# Patient Record
Sex: MALE | Race: ASIAN | NOT HISPANIC OR LATINO
[De-identification: names, ages, dates, MRNs, and addresses within clinical notes are randomized per-mention and may not be internally consistent; named-entity substitution may affect disease eponyms.]

---

## 2017-04-04 ENCOUNTER — APPOINTMENT (OUTPATIENT)
Dept: INTERNAL MEDICINE | Facility: CLINIC | Age: 32
End: 2017-04-04

## 2017-05-18 ENCOUNTER — TRANSCRIPTION ENCOUNTER (OUTPATIENT)
Age: 32
End: 2017-05-18

## 2017-05-27 ENCOUNTER — EMERGENCY (EMERGENCY)
Facility: HOSPITAL | Age: 32
LOS: 1 days | Discharge: HOME | End: 2017-05-27
Attending: PERSONAL EMERGENCY RESPONSE ATTENDANT | Admitting: PERSONAL EMERGENCY RESPONSE ATTENDANT
Payer: COMMERCIAL

## 2017-05-27 VITALS
OXYGEN SATURATION: 96 % | SYSTOLIC BLOOD PRESSURE: 113 MMHG | RESPIRATION RATE: 18 BRPM | HEART RATE: 96 BPM | TEMPERATURE: 99 F | DIASTOLIC BLOOD PRESSURE: 73 MMHG

## 2017-05-27 VITALS
SYSTOLIC BLOOD PRESSURE: 113 MMHG | DIASTOLIC BLOOD PRESSURE: 72 MMHG | RESPIRATION RATE: 18 BRPM | HEART RATE: 106 BPM | TEMPERATURE: 99 F | OXYGEN SATURATION: 94 %

## 2017-05-27 DIAGNOSIS — R10.31 RIGHT LOWER QUADRANT PAIN: ICD-10-CM

## 2017-05-27 DIAGNOSIS — R19.7 DIARRHEA, UNSPECIFIED: ICD-10-CM

## 2017-05-27 DIAGNOSIS — R00.0 TACHYCARDIA, UNSPECIFIED: ICD-10-CM

## 2017-05-27 DIAGNOSIS — R50.9 FEVER, UNSPECIFIED: ICD-10-CM

## 2017-05-27 DIAGNOSIS — R63.0 ANOREXIA: ICD-10-CM

## 2017-05-27 DIAGNOSIS — R11.0 NAUSEA: ICD-10-CM

## 2017-05-27 LAB
ALBUMIN SERPL ELPH-MCNC: 4.2 G/DL — SIGNIFICANT CHANGE UP (ref 3.3–5)
ALP SERPL-CCNC: 53 U/L — SIGNIFICANT CHANGE UP (ref 40–120)
ALT FLD-CCNC: 19 U/L RC — SIGNIFICANT CHANGE UP (ref 10–45)
ANION GAP SERPL CALC-SCNC: 14 MMOL/L — SIGNIFICANT CHANGE UP (ref 5–17)
APPEARANCE UR: CLEAR — SIGNIFICANT CHANGE UP
AST SERPL-CCNC: 24 U/L — SIGNIFICANT CHANGE UP (ref 10–40)
BACTERIA # UR AUTO: ABNORMAL /HPF
BASOPHILS # BLD AUTO: 0 K/UL — SIGNIFICANT CHANGE UP (ref 0–0.2)
BILIRUB SERPL-MCNC: 0.9 MG/DL — SIGNIFICANT CHANGE UP (ref 0.2–1.2)
BILIRUB UR-MCNC: NEGATIVE — SIGNIFICANT CHANGE UP
BUN SERPL-MCNC: 13 MG/DL — SIGNIFICANT CHANGE UP (ref 7–23)
CALCIUM SERPL-MCNC: 9.6 MG/DL — SIGNIFICANT CHANGE UP (ref 8.4–10.5)
CHLORIDE SERPL-SCNC: 102 MMOL/L — SIGNIFICANT CHANGE UP (ref 96–108)
CO2 SERPL-SCNC: 25 MMOL/L — SIGNIFICANT CHANGE UP (ref 22–31)
COLOR SPEC: YELLOW — SIGNIFICANT CHANGE UP
CREAT SERPL-MCNC: 1.35 MG/DL — HIGH (ref 0.5–1.3)
DIFF PNL FLD: ABNORMAL
EOSINOPHIL # BLD AUTO: 0 K/UL — SIGNIFICANT CHANGE UP (ref 0–0.5)
GAS PNL BLDV: SIGNIFICANT CHANGE UP
GLUCOSE SERPL-MCNC: 146 MG/DL — HIGH (ref 70–99)
GLUCOSE UR QL: NEGATIVE — SIGNIFICANT CHANGE UP
HCT VFR BLD CALC: 45.3 % — SIGNIFICANT CHANGE UP (ref 39–50)
HGB BLD-MCNC: 15.3 G/DL — SIGNIFICANT CHANGE UP (ref 13–17)
KETONES UR-MCNC: ABNORMAL
LEUKOCYTE ESTERASE UR-ACNC: NEGATIVE — SIGNIFICANT CHANGE UP
LIDOCAIN IGE QN: 15 U/L — SIGNIFICANT CHANGE UP (ref 7–60)
LYMPHOCYTES # BLD AUTO: 0.5 K/UL — LOW (ref 1–3.3)
LYMPHOCYTES # BLD AUTO: 3 % — LOW (ref 13–44)
MAGNESIUM SERPL-MCNC: 1.7 MG/DL — SIGNIFICANT CHANGE UP (ref 1.6–2.6)
MCHC RBC-ENTMCNC: 30.6 PG — SIGNIFICANT CHANGE UP (ref 27–34)
MCHC RBC-ENTMCNC: 33.6 GM/DL — SIGNIFICANT CHANGE UP (ref 32–36)
MCV RBC AUTO: 90.9 FL — SIGNIFICANT CHANGE UP (ref 80–100)
MONOCYTES # BLD AUTO: 1 K/UL — HIGH (ref 0–0.9)
MONOCYTES NFR BLD AUTO: 3 % — SIGNIFICANT CHANGE UP (ref 2–14)
NEUTROPHILS # BLD AUTO: 15.5 K/UL — HIGH (ref 1.8–7.4)
NEUTROPHILS NFR BLD AUTO: 87 % — HIGH (ref 43–77)
NITRITE UR-MCNC: NEGATIVE — SIGNIFICANT CHANGE UP
PH UR: 6 — SIGNIFICANT CHANGE UP (ref 5–8)
PHOSPHATE SERPL-MCNC: 1.4 MG/DL — LOW (ref 2.5–4.5)
PLATELET # BLD AUTO: 239 K/UL — SIGNIFICANT CHANGE UP (ref 150–400)
POTASSIUM SERPL-MCNC: 3.8 MMOL/L — SIGNIFICANT CHANGE UP (ref 3.5–5.3)
POTASSIUM SERPL-SCNC: 3.8 MMOL/L — SIGNIFICANT CHANGE UP (ref 3.5–5.3)
PROT SERPL-MCNC: 7.4 G/DL — SIGNIFICANT CHANGE UP (ref 6–8.3)
PROT UR-MCNC: 100 MG/DL
RBC # BLD: 4.99 M/UL — SIGNIFICANT CHANGE UP (ref 4.2–5.8)
RBC # FLD: 11.9 % — SIGNIFICANT CHANGE UP (ref 10.3–14.5)
RBC CASTS # UR COMP ASSIST: ABNORMAL /HPF (ref 0–2)
SODIUM SERPL-SCNC: 141 MMOL/L — SIGNIFICANT CHANGE UP (ref 135–145)
SP GR SPEC: >1.03 — HIGH (ref 1.01–1.02)
UROBILINOGEN FLD QL: NEGATIVE — SIGNIFICANT CHANGE UP
WBC # BLD: 17.1 K/UL — HIGH (ref 3.8–10.5)
WBC # FLD AUTO: 17.1 K/UL — HIGH (ref 3.8–10.5)
WBC UR QL: SIGNIFICANT CHANGE UP /HPF (ref 0–5)

## 2017-05-27 PROCEDURE — 85027 COMPLETE CBC AUTOMATED: CPT

## 2017-05-27 PROCEDURE — 82330 ASSAY OF CALCIUM: CPT

## 2017-05-27 PROCEDURE — 84295 ASSAY OF SERUM SODIUM: CPT

## 2017-05-27 PROCEDURE — 84132 ASSAY OF SERUM POTASSIUM: CPT

## 2017-05-27 PROCEDURE — 83605 ASSAY OF LACTIC ACID: CPT

## 2017-05-27 PROCEDURE — 81001 URINALYSIS AUTO W/SCOPE: CPT

## 2017-05-27 PROCEDURE — 96375 TX/PRO/DX INJ NEW DRUG ADDON: CPT

## 2017-05-27 PROCEDURE — 82947 ASSAY GLUCOSE BLOOD QUANT: CPT

## 2017-05-27 PROCEDURE — 80053 COMPREHEN METABOLIC PANEL: CPT

## 2017-05-27 PROCEDURE — 83690 ASSAY OF LIPASE: CPT

## 2017-05-27 PROCEDURE — 82435 ASSAY OF BLOOD CHLORIDE: CPT

## 2017-05-27 PROCEDURE — 84100 ASSAY OF PHOSPHORUS: CPT

## 2017-05-27 PROCEDURE — 85014 HEMATOCRIT: CPT

## 2017-05-27 PROCEDURE — 87086 URINE CULTURE/COLONY COUNT: CPT

## 2017-05-27 PROCEDURE — 83735 ASSAY OF MAGNESIUM: CPT

## 2017-05-27 PROCEDURE — 99284 EMERGENCY DEPT VISIT MOD MDM: CPT | Mod: 25

## 2017-05-27 PROCEDURE — 99284 EMERGENCY DEPT VISIT MOD MDM: CPT

## 2017-05-27 PROCEDURE — 96374 THER/PROPH/DIAG INJ IV PUSH: CPT

## 2017-05-27 PROCEDURE — 82803 BLOOD GASES ANY COMBINATION: CPT

## 2017-05-27 RX ORDER — ONDANSETRON 8 MG/1
4 TABLET, FILM COATED ORAL ONCE
Qty: 0 | Refills: 0 | Status: COMPLETED | OUTPATIENT
Start: 2017-05-27 | End: 2017-05-27

## 2017-05-27 RX ORDER — SODIUM CHLORIDE 9 MG/ML
1000 INJECTION INTRAMUSCULAR; INTRAVENOUS; SUBCUTANEOUS ONCE
Qty: 0 | Refills: 0 | Status: COMPLETED | OUTPATIENT
Start: 2017-05-27 | End: 2017-05-27

## 2017-05-27 RX ORDER — ACETAMINOPHEN 500 MG
1000 TABLET ORAL ONCE
Qty: 0 | Refills: 0 | Status: COMPLETED | OUTPATIENT
Start: 2017-05-27 | End: 2017-05-27

## 2017-05-27 RX ORDER — ONDANSETRON 8 MG/1
1 TABLET, FILM COATED ORAL
Qty: 20 | Refills: 0 | OUTPATIENT
Start: 2017-05-27 | End: 2017-06-03

## 2017-05-27 RX ADMIN — Medication 400 MILLIGRAM(S): at 21:34

## 2017-05-27 RX ADMIN — ONDANSETRON 4 MILLIGRAM(S): 8 TABLET, FILM COATED ORAL at 21:34

## 2017-05-27 RX ADMIN — SODIUM CHLORIDE 2000 MILLILITER(S): 9 INJECTION INTRAMUSCULAR; INTRAVENOUS; SUBCUTANEOUS at 21:34

## 2017-05-27 NOTE — ED PROVIDER NOTE - PLAN OF CARE
1. abdominal pain, diarrhea, fever  2. Stay well-hydrated. Take tylenol as directed as needed for pain control. Take zofran as directed as needed for nausea.   3. Follow-up with your primary care doctor in 24-48hrs.   4. Return immediately to the emergency department if your abdominal pain returns, becomes severe, if you are unable to eat or drink, or if other worsening or concerning symptoms.

## 2017-05-27 NOTE — ED PROVIDER NOTE - NS ED ROS FT
Constitutional: +fever, no chills.  Eyes: no visual changes.  ENMT: no sore throat.  CV: no chest pain.  Resp: no cough, no shortness of breath.  GI: +abdominal pain, +nausea, no vomiting, +diarrhea.  : no dysuria, no hematuria.  MSK: no back pain, no neck pain.  Skin: no rashes.  Neuro: no headache, no loss of consciousness, no weakness, no numbness, no tingling.  Psych: no known mental health issues.  Endo: no diabetes, no thyroid trouble.

## 2017-05-27 NOTE — ED PROVIDER NOTE - MEDICAL DECISION MAKING DETAILS
Resident: crampy abdominal pain, diarrhea, fever. tired appearing, tachy, febrile. tenderness to palpation right lower quadrant. gastroenteritis vs appy, will get labs, ctap, fluids, pain control antiemetics.

## 2017-05-27 NOTE — ED PROVIDER NOTE - PROGRESS NOTE DETAILS
Resident: patient feels better after IV tylenol and wants to go home, is concerned about the bill for the CT. On reexamination, non-tender to palpation. Discussed that this may be early appendicitis vs gastroenteritis, that if his symptoms return or worsen he must return immediately to the emergency department. patient and wife, who is bedside, verbalize understanding.

## 2017-05-27 NOTE — ED PROVIDER NOTE - CARE PLAN
Principal Discharge DX:	Diarrhea, unspecified type  Secondary Diagnosis:	Fever, unspecified fever cause Principal Discharge DX:	Diarrhea, unspecified type  Instructions for follow-up, activity and diet:	1. abdominal pain, diarrhea, fever  2. Stay well-hydrated. Take tylenol as directed as needed for pain control. Take zofran as directed as needed for nausea.   3. Follow-up with your primary care doctor in 24-48hrs.   4. Return immediately to the emergency department if your abdominal pain returns, becomes severe, if you are unable to eat or drink, or if other worsening or concerning symptoms.  Secondary Diagnosis:	Fever, unspecified fever cause

## 2017-05-27 NOTE — ED PROVIDER NOTE - OBJECTIVE STATEMENT
Resident: 31y M presents with fever, diarrhea. Yesterday during the day, started having stomach cramps. In the evening, started having nausea, non-bloody watery diarrhea, fever Tmax 103, chills, muscle aches. Abdominal pain is constant, crampy, 7/10, mild improvement with ibuprofen. Decreased po intake today. No recent travel, no recent abx use, no sick contacts. Tried advil, imodium.    PMD Lorenzo Juarez    Pharm [facility]

## 2017-05-27 NOTE — ED PROVIDER NOTE - PHYSICAL EXAMINATION
Resident: Gen: tired appearing, of stated age, no acute distress; ENT: PERRL, MMM, no uvular deviation, no tonsilar erythema; Chest: CTAB, no retractions, rate normal, appears to breathe comfortably; Heart: tachy, regular S1S2 No peripheral edema b/l pulses 2+ in arms and legs; Abd: Soft non-tender, no rebound or guarding, no masses, no garcia sign, +mcburney tenderness, +psoas sign, no rosving, no psoas sign, no CVAT; Back: No spinal deformity; Ext: Moving all 4 extremities without obvious impairment to ROM, no obvious weakness; Neuro: fluid speech, alert and oriented; Psych: No anxiety, depression or pressured speech noted; Skin: no urticaria, no diffuse rash.

## 2017-05-27 NOTE — ED ADULT NURSE NOTE - OBJECTIVE STATEMENT
31y male c/o nausea/diarrhea. A&Ox3, neuro intact. States nausea and diarrhea since 1900 yesterday. Denies blood in stool. Patient took imodium last night with no relief. Fever 103F max at home. Took advil at 1400 for fever. Reports intermittent lower quadrants abdominal cramps. Reports full body muscle aches and fever/chills. Denies headache, cough, dizziness. Denies sick contacts. Denies chest pain, sob and vomiting.

## 2017-05-28 LAB
CULTURE RESULTS: NO GROWTH — SIGNIFICANT CHANGE UP
SPECIMEN SOURCE: SIGNIFICANT CHANGE UP

## 2023-04-17 ENCOUNTER — APPOINTMENT (OUTPATIENT)
Dept: PEDIATRIC MEDICAL GENETICS | Facility: CLINIC | Age: 38
End: 2023-04-17
Payer: COMMERCIAL

## 2023-04-17 PROCEDURE — 96040: CPT | Mod: 95

## 2023-06-20 ENCOUNTER — APPOINTMENT (OUTPATIENT)
Dept: UROLOGY | Facility: CLINIC | Age: 38
End: 2023-06-20
Payer: COMMERCIAL

## 2023-06-20 VITALS
HEART RATE: 82 BPM | BODY MASS INDEX: 30.9 KG/M2 | OXYGEN SATURATION: 96 % | DIASTOLIC BLOOD PRESSURE: 90 MMHG | HEIGHT: 64 IN | SYSTOLIC BLOOD PRESSURE: 127 MMHG | TEMPERATURE: 98 F | WEIGHT: 181 LBS

## 2023-06-20 PROCEDURE — 99203 OFFICE O/P NEW LOW 30 MIN: CPT

## 2023-06-21 NOTE — HISTORY OF PRESENT ILLNESS
[FreeTextEntry1] : CELE ELMORE presents to the office today. He is a 37 year man here today for discussion of vasectomy. He is currently in a relationship. They patient has definitively decided this along with his partner. They have two children, 3 year old and 3 month. \par \par He denies any prior history of either scrotal or inguinal surgery or trauma. He has no urinary bother. No history of sexually transmitted infection. \par

## 2023-07-20 ENCOUNTER — APPOINTMENT (OUTPATIENT)
Dept: PEDIATRIC MEDICAL GENETICS | Facility: CLINIC | Age: 38
End: 2023-07-20
Payer: COMMERCIAL

## 2023-07-20 PROCEDURE — 96040: CPT | Mod: 95

## 2023-08-29 ENCOUNTER — NON-APPOINTMENT (OUTPATIENT)
Age: 38
End: 2023-08-29

## 2023-09-07 ENCOUNTER — APPOINTMENT (OUTPATIENT)
Dept: UROLOGY | Facility: CLINIC | Age: 38
End: 2023-09-07

## 2023-10-01 PROBLEM — Z30.2 ENCOUNTER FOR VASECTOMY: Status: ACTIVE | Noted: 2023-06-19

## 2023-10-03 ENCOUNTER — OUTPATIENT (OUTPATIENT)
Dept: OUTPATIENT SERVICES | Facility: HOSPITAL | Age: 38
LOS: 1 days | End: 2023-10-03
Payer: COMMERCIAL

## 2023-10-03 ENCOUNTER — APPOINTMENT (OUTPATIENT)
Dept: UROLOGY | Facility: CLINIC | Age: 38
End: 2023-10-03
Payer: COMMERCIAL

## 2023-10-03 VITALS
SYSTOLIC BLOOD PRESSURE: 139 MMHG | HEART RATE: 83 BPM | RESPIRATION RATE: 16 BRPM | OXYGEN SATURATION: 97 % | DIASTOLIC BLOOD PRESSURE: 93 MMHG

## 2023-10-03 DIAGNOSIS — Z30.2 ENCOUNTER FOR STERILIZATION: ICD-10-CM

## 2023-10-03 DIAGNOSIS — R35.0 FREQUENCY OF MICTURITION: ICD-10-CM

## 2023-10-03 PROCEDURE — 55250 REMOVAL OF SPERM DUCT(S): CPT

## 2023-10-04 DIAGNOSIS — Z30.2 ENCOUNTER FOR STERILIZATION: ICD-10-CM

## 2024-01-28 ENCOUNTER — NON-APPOINTMENT (OUTPATIENT)
Age: 39
End: 2024-01-28

## 2024-04-28 NOTE — ED PROVIDER NOTE - ATTENDING CONTRIBUTION TO CARE
Attending MD Paez.  Agree with above.  Pt is a 31 yr old male presenting to ED with complain of nausea, diarrhea and abdominal pain x 1.5 days.  PT had focal RLQ TTP, + psoas sign, +mcburney’s point TTP for resident on initial exam.  Pt has no other known medical problems.  Presents with ~1.5 days fever, nausea, watery, non-bloody diarrhea, anorexia.  Pt has not vomited but endorses anorexia.  Pain is crampy, constant.  Pt took some ibu profen with mild relief.  Attempted immodium last night.  PT blames ‘[employer]’ from last night however no one else at home is sick and abdominal TTP.  On attending exam pt has diffuse crampy pain without R focal TTP.  Negative mcburney’s point, negative psoas, negative rosvings.  Pt and wife are both present and advised of elevated WBC, heart rate and temp.  They are both comfortable with deferring CT abd/pelvis at this time with likely gastroenteritis.  Discussed possible appendicitis however nature of complaint with frequent diarrhea, diffuse crampy abdominal pain, fever without persistent focal RLQ pain is more c/w gastroenteritis than acute appy.  Pt’s mom is a nurse and will be assisting at home.  Prolonged discussion re: need to increase hydration at home, alternate Tylenol/motrin and return to ED immediately for any focal RLQ that recurs.  Pt and wife both taught that RLQ pain warrants return to ED for CT.  Wife and pt both endorse comfort with plan to d/c without CT at this time.  Will repeat HR/temp prior to d/c. charles who can help with small task, no relative or emergency contact/no one

## 2025-01-18 ENCOUNTER — NON-APPOINTMENT (OUTPATIENT)
Age: 40
End: 2025-01-18